# Patient Record
Sex: FEMALE | Race: BLACK OR AFRICAN AMERICAN | NOT HISPANIC OR LATINO | URBAN - METROPOLITAN AREA
[De-identification: names, ages, dates, MRNs, and addresses within clinical notes are randomized per-mention and may not be internally consistent; named-entity substitution may affect disease eponyms.]

---

## 2020-02-27 ENCOUNTER — EMERGENCY (EMERGENCY)
Facility: HOSPITAL | Age: 30
LOS: 1 days | Discharge: HOME | End: 2020-02-27
Attending: EMERGENCY MEDICINE | Admitting: EMERGENCY MEDICINE
Payer: COMMERCIAL

## 2020-02-27 VITALS
SYSTOLIC BLOOD PRESSURE: 119 MMHG | TEMPERATURE: 99 F | HEART RATE: 90 BPM | RESPIRATION RATE: 16 BRPM | DIASTOLIC BLOOD PRESSURE: 78 MMHG | OXYGEN SATURATION: 100 %

## 2020-02-27 VITALS
SYSTOLIC BLOOD PRESSURE: 123 MMHG | HEART RATE: 90 BPM | DIASTOLIC BLOOD PRESSURE: 82 MMHG | TEMPERATURE: 98 F | RESPIRATION RATE: 18 BRPM

## 2020-02-27 PROCEDURE — 99284 EMERGENCY DEPT VISIT MOD MDM: CPT

## 2020-02-27 RX ORDER — AZITHROMYCIN 500 MG/1
1 TABLET, FILM COATED ORAL
Qty: 6 | Refills: 0
Start: 2020-02-27 | End: 2020-03-02

## 2020-02-27 RX ORDER — FAMOTIDINE 10 MG/ML
20 INJECTION INTRAVENOUS ONCE
Refills: 0 | Status: COMPLETED | OUTPATIENT
Start: 2020-02-27 | End: 2020-02-27

## 2020-02-27 RX ADMIN — Medication 50 MILLIGRAM(S): at 05:39

## 2020-02-27 RX ADMIN — FAMOTIDINE 20 MILLIGRAM(S): 10 INJECTION INTRAVENOUS at 05:07

## 2020-02-27 NOTE — ED PROVIDER NOTE - PATIENT PORTAL LINK FT
You can access the FollowMyHealth Patient Portal offered by [facility] by registering at the following website: [email or web address]. By joining [facility]’s FollowMyHealth portal, you will also be able to view your health information using other applications (apps) compatible with our system.

## 2020-02-27 NOTE — ED PROVIDER NOTE - OBJECTIVE STATEMENT
30 yo F, w/ no known PMH and on no daily medications, BIBEMS from home w/ chief complaint of allergic reaction. Patient states that she has been having sore throat for a few days, and went to urgent care yesterday, where diagnosed w/ strep throat, and prescribed Amoxicillin. Patient took first dose of amoxicillin at 19:00pm on 02/26/2020, and woke up overnight with hives and pruritus. She called EMS who gave her 50mg IV Benadryl, with resolution of hives. Patient denies throat closure, facial or oral swelling, n/v/d, abdominal pain, lightheadedness, or any complaints other than hives and pruritus. Patient currently states that hives have resolved, but still endorses pruritus.     PMD: Does not have one  Pharm: [facility] [place], [place]

## 2020-02-27 NOTE — ED PROVIDER NOTE - PROGRESS NOTE DETAILS
Chintan, PGY3: Patient reports resolution of pruritus, and denies hives at this time. Patient states she currently feels asymptomatic other than her strep throat pain. On re-examination, patient continues to have lungs CTAB and no angioedema. Will discharge patient home at this time w/ Azithromycin prescription for treatment of strep throat as well as short steroid course.

## 2020-02-27 NOTE — ED PROVIDER NOTE - CLINICAL SUMMARY MEDICAL DECISION MAKING FREE TEXT BOX
30 yo F, w/ no known PMH and on no daily medications, presenting to ED d/t allergic reaction described as hives upon waking up after taking amoxicillin last night, for Strep throat diagnosed at urgent care yesterday. No n/v/d, angioedema, wheezing, sob, lightheadedness, difficulty breathing, or other symptoms suggestive of anaphylaxis, with resolution of hives prior to arrival after IV 50mg Benadryl given by EMS. Patient still pruritic in ED. Will prescribe Pepcid and Prednisone in ED and reassess. Likely d/c home w/ allergy/immunology followup.

## 2020-02-27 NOTE — ED PROVIDER NOTE - ENMT, MLM
Airway patent, Nasal mucosa clear. Mouth with normal mucosa. Throat has no vesicles, uvula is midline. No lip, tongue, or uvual swelling. +BL tonsillar exudates.

## 2020-02-27 NOTE — ED PROVIDER NOTE - ATTENDING CONTRIBUTION TO CARE
Fitzpatrick: 29 yof with sudden onset of hives, "wheels" on entire body after taking Amoxicillin for strep throat. Pt noted slight increase in throat swelling but no sob or cp. no nausea, no vomiting, no abd pain, no diarrhea. Pt has hx of allergy to shellfish. Pt given benadryl 50mg IV by EMS with some improvement. On exam, bilateral tonsilar edema with exudate. no lad, no uvular edema, clear lungs, skin some areas of redness but no visible urticaria, pt is still very itchy. Will give steroids and h2 blocker. Reassess. No concern for anaphylaxis, only 1 organ system appears involved on exam.

## 2020-02-27 NOTE — ED PROVIDER NOTE - NSFOLLOWUPINSTRUCTIONS_ED_ALL_ED_FT
Please take Benadryl (Diphenhydramine), one tablet, every 4-8 hours, as needed for symptom control. Please be aware that Benadryl may cause CNS depression, which may impair physical or mental abilities; please avoid performing tasks which require mental alertness (eg, operating machinery or driving). Please take Prednisone (50mg tablet), one tablet, once per day, for 4 days. Please take Azithromycin (250mg tablet), two tablets on day one, and then one tablet on days 2 through 5.     Please follow up with an allergist as soon as possible. You can make an appointment at the following location:  [facility] Division of Allergy/Immunology  [address] [place] [address] [address], [place] [address]  [phone]     Please return to the emergency department if you experience worsening symptoms, or if you develop headache, neck stiffness, fever/chills, changes in vision, chest pain, shortness of breath, difficulty breathing, palpitations, lightheadedness, weakness, dizziness, numbness, tingling, abdominal pain, nausea, vomiting, diarrhea, changes in your urine, blood in the urine, painful urination, syncope (passing out), or for any other concerns.

## 2020-02-27 NOTE — ED ADULT TRIAGE NOTE - CHIEF COMPLAINT QUOTE
pt presents to the ed for hives and possible allergic reaction to amoxicillin. pt took amoxicillin at 7pm for strep woke up and noticed whole body hives and increased swelling in her throat. EMS states her airway was patient but presenting with full whole body hives. EMS gave 50mg benadryl via 20G iv placed in right hand. patient speaking in clear and full sentences, no drooling. Will continue to monitor closely

## 2020-02-27 NOTE — ED ADULT NURSE NOTE - OBJECTIVE STATEMENT
received pt to room 3 aox4 in no apparent distress VSS c/o hives, possible allergic reaction. Pt states started amoxicillin yesterday for strep took two doses thus far, woke up middle of night covered in hives and itching. Pt denies difficulty swallowing or breathing but states she feels like her tongue was swollen earlier. Pt given benadryl IV by EMS, 20 G L hand flushes well positive blood return. Pt denies chest pain, weakness, n&v. Pt noted with diffuse hives on entire body with mild swelling under her eye. Breaths equal and unlabored medicated as per order will continue to monitor

## 2020-02-27 NOTE — ED PROVIDER NOTE - CONSTITUTIONAL, MLM
normal... Well appearing, awake, alert, oriented to person, place, time/situation and in no apparent distress. Patient scratches her arms and chest frequently during exam.

## 2023-07-06 ENCOUNTER — EMERGENCY (EMERGENCY)
Facility: HOSPITAL | Age: 33
LOS: 1 days | Discharge: HOME | End: 2023-07-06
Attending: EMERGENCY MEDICINE | Admitting: EMERGENCY MEDICINE
Payer: COMMERCIAL

## 2023-07-06 VITALS
RESPIRATION RATE: 18 BRPM | HEART RATE: 72 BPM | DIASTOLIC BLOOD PRESSURE: 84 MMHG | SYSTOLIC BLOOD PRESSURE: 120 MMHG | TEMPERATURE: 99 F | OXYGEN SATURATION: 100 %

## 2023-07-06 VITALS
HEART RATE: 72 BPM | DIASTOLIC BLOOD PRESSURE: 63 MMHG | OXYGEN SATURATION: 100 % | SYSTOLIC BLOOD PRESSURE: 105 MMHG | RESPIRATION RATE: 15 BRPM | TEMPERATURE: 98 F

## 2023-07-06 DIAGNOSIS — R60.0 LOCALIZED EDEMA: ICD-10-CM

## 2023-07-06 PROBLEM — Z78.9 OTHER SPECIFIED HEALTH STATUS: Chronic | Status: ACTIVE | Noted: 2020-02-27

## 2023-07-06 LAB
ALBUMIN SERPL ELPH-MCNC: 3.9 G/DL — SIGNIFICANT CHANGE UP (ref 3.3–5)
ALP SERPL-CCNC: 68 U/L — SIGNIFICANT CHANGE UP (ref 40–120)
ALT FLD-CCNC: 10 U/L — SIGNIFICANT CHANGE UP (ref 4–33)
ANION GAP SERPL CALC-SCNC: 10 MMOL/L — SIGNIFICANT CHANGE UP (ref 7–14)
AST SERPL-CCNC: 15 U/L — SIGNIFICANT CHANGE UP (ref 4–32)
BASOPHILS # BLD AUTO: 0.02 K/UL — SIGNIFICANT CHANGE UP (ref 0–0.2)
BASOPHILS NFR BLD AUTO: 0.4 % — SIGNIFICANT CHANGE UP (ref 0–2)
BILIRUB SERPL-MCNC: 0.4 MG/DL — SIGNIFICANT CHANGE UP (ref 0.2–1.2)
BUN SERPL-MCNC: 14 MG/DL — SIGNIFICANT CHANGE UP (ref 7–23)
CALCIUM SERPL-MCNC: 9.3 MG/DL — SIGNIFICANT CHANGE UP (ref 8.4–10.5)
CHLORIDE SERPL-SCNC: 105 MMOL/L — SIGNIFICANT CHANGE UP (ref 98–107)
CO2 SERPL-SCNC: 22 MMOL/L — SIGNIFICANT CHANGE UP (ref 22–31)
CREAT SERPL-MCNC: 0.7 MG/DL — SIGNIFICANT CHANGE UP (ref 0.5–1.3)
EGFR: 119 ML/MIN/1.73M2 — SIGNIFICANT CHANGE UP
EOSINOPHIL # BLD AUTO: 0.06 K/UL — SIGNIFICANT CHANGE UP (ref 0–0.5)
EOSINOPHIL NFR BLD AUTO: 1.1 % — SIGNIFICANT CHANGE UP (ref 0–6)
GLUCOSE SERPL-MCNC: 116 MG/DL — HIGH (ref 70–99)
HCG SERPL-ACNC: <1 MIU/ML — SIGNIFICANT CHANGE UP
HCT VFR BLD CALC: 37 % — SIGNIFICANT CHANGE UP (ref 34.5–45)
HGB BLD-MCNC: 11.3 G/DL — LOW (ref 11.5–15.5)
HIV 1+2 AB+HIV1 P24 AG SERPL QL IA: SIGNIFICANT CHANGE UP
IANC: 1.84 K/UL — SIGNIFICANT CHANGE UP (ref 1.8–7.4)
IMM GRANULOCYTES NFR BLD AUTO: 0.2 % — SIGNIFICANT CHANGE UP (ref 0–0.9)
LYMPHOCYTES # BLD AUTO: 3.04 K/UL — SIGNIFICANT CHANGE UP (ref 1–3.3)
LYMPHOCYTES # BLD AUTO: 56.2 % — HIGH (ref 13–44)
MCHC RBC-ENTMCNC: 27.3 PG — SIGNIFICANT CHANGE UP (ref 27–34)
MCHC RBC-ENTMCNC: 30.5 GM/DL — LOW (ref 32–36)
MCV RBC AUTO: 89.4 FL — SIGNIFICANT CHANGE UP (ref 80–100)
MONOCYTES # BLD AUTO: 0.44 K/UL — SIGNIFICANT CHANGE UP (ref 0–0.9)
MONOCYTES NFR BLD AUTO: 8.1 % — SIGNIFICANT CHANGE UP (ref 2–14)
NEUTROPHILS # BLD AUTO: 1.84 K/UL — SIGNIFICANT CHANGE UP (ref 1.8–7.4)
NEUTROPHILS NFR BLD AUTO: 34 % — LOW (ref 43–77)
NRBC # BLD: 0 /100 WBCS — SIGNIFICANT CHANGE UP (ref 0–0)
NRBC # FLD: 0 K/UL — SIGNIFICANT CHANGE UP (ref 0–0)
PLATELET # BLD AUTO: 243 K/UL — SIGNIFICANT CHANGE UP (ref 150–400)
POTASSIUM SERPL-MCNC: 3.8 MMOL/L — SIGNIFICANT CHANGE UP (ref 3.5–5.3)
POTASSIUM SERPL-SCNC: 3.8 MMOL/L — SIGNIFICANT CHANGE UP (ref 3.5–5.3)
PROT SERPL-MCNC: 6.7 G/DL — SIGNIFICANT CHANGE UP (ref 6–8.3)
RBC # BLD: 4.14 M/UL — SIGNIFICANT CHANGE UP (ref 3.8–5.2)
RBC # FLD: 12.2 % — SIGNIFICANT CHANGE UP (ref 10.3–14.5)
SODIUM SERPL-SCNC: 137 MMOL/L — SIGNIFICANT CHANGE UP (ref 135–145)
TSH SERPL-MCNC: 2.81 UIU/ML — SIGNIFICANT CHANGE UP (ref 0.27–4.2)
WBC # BLD: 5.41 K/UL — SIGNIFICANT CHANGE UP (ref 3.8–10.5)
WBC # FLD AUTO: 5.41 K/UL — SIGNIFICANT CHANGE UP (ref 3.8–10.5)

## 2023-07-06 PROCEDURE — 99285 EMERGENCY DEPT VISIT HI MDM: CPT

## 2023-07-06 PROCEDURE — 31575 DIAGNOSTIC LARYNGOSCOPY: CPT

## 2023-07-06 RX ORDER — DEXAMETHASONE 0.5 MG/5ML
10 ELIXIR ORAL ONCE
Refills: 0 | Status: COMPLETED | OUTPATIENT
Start: 2023-07-06 | End: 2023-07-06

## 2023-07-06 RX ORDER — FAMOTIDINE 10 MG/ML
20 INJECTION INTRAVENOUS ONCE
Refills: 0 | Status: COMPLETED | OUTPATIENT
Start: 2023-07-06 | End: 2023-07-06

## 2023-07-06 RX ORDER — DIPHENHYDRAMINE HCL 50 MG
25 CAPSULE ORAL ONCE
Refills: 0 | Status: COMPLETED | OUTPATIENT
Start: 2023-07-06 | End: 2023-07-06

## 2023-07-06 RX ORDER — EPINEPHRINE 0.3 MG/.3ML
0.3 INJECTION INTRAMUSCULAR; SUBCUTANEOUS ONCE
Refills: 0 | Status: COMPLETED | OUTPATIENT
Start: 2023-07-06 | End: 2023-07-06

## 2023-07-06 RX ORDER — EPINEPHRINE 0.3 MG/.3ML
0.3 INJECTION INTRAMUSCULAR; SUBCUTANEOUS
Qty: 1 | Refills: 0
Start: 2023-07-06 | End: 2023-07-17

## 2023-07-06 RX ADMIN — EPINEPHRINE 0.3 MILLIGRAM(S): 0.3 INJECTION INTRAMUSCULAR; SUBCUTANEOUS at 02:15

## 2023-07-06 RX ADMIN — FAMOTIDINE 20 MILLIGRAM(S): 10 INJECTION INTRAVENOUS at 01:58

## 2023-07-06 RX ADMIN — Medication 102 MILLIGRAM(S): at 02:06

## 2023-07-06 RX ADMIN — Medication 25 MILLIGRAM(S): at 01:58

## 2023-07-06 NOTE — ED ADULT NURSE REASSESSMENT NOTE - NS ED NURSE REASSESS COMMENT FT1
Pt sleeping in bed comfortably, NAD, NSR on the monitor. Respirations even and unlabored. VSS in flowsheet. Pt denies throat closing, difficulty breathing, shortness of breath, N/V/D, pruritis.

## 2023-07-06 NOTE — ED PROVIDER NOTE - CLINICAL SUMMARY MEDICAL DECISION MAKING FREE TEXT BOX
33 yo F hx of lupus not on meds presents with lip swelling, sore throat since this evening - vss. No wheezing on exam. Will give pepcid, benadryl. Will monitor for worsening symptoms - will give Epi for any evidence of progression of symptoms.

## 2023-07-06 NOTE — ED PROVIDER NOTE - ATTENDING CONTRIBUTION TO CARE
HPI: 32-year-old female with recently diagnosed lupus not on medications that presents with lip swelling, sore throat, nausea concern for allergic reaction.  Patient reports that she woke up with symptoms as above and took 1 p.o. Benadryl 25 mg prior to arrival.  Patient states she has had similar symptoms twice in the past and recently saw a dermatologist who diagnosed with lupus with a biopsy but also saw an allergist that did not find any allergens that can cause her condition.  Patient states that this is happened in the past and has been seen at emergency room's but no diagnosis has been given.  Patient denies any new allergens, ingestions, exposures.  Unknown cause of her condition but no drooling no stridor no wheezing no vomiting no fevers no chills no urinary symptoms no URI symptoms.  Non-smoker no drinking no drugs.  LMP 2 weeks ago.  Does not think she is pregnant.    EXAM: Speaking full sentences alert and oriented x3, upper lip with swelling compared to lower lip but oropharynx is otherwise normal uvula is midline tongue is flat.  No brawny induration in the anterior neck.  No stridor no wheezing on lungs abdomen soft nontender skin with hives.  No excoriations.    MDM: 32-year-old female recently diagnosed with lupus not on medications that presents with what appears to be allergic reaction to unknown cause/allergen.  Has had this prior but no allergen found.  Saw allergy doctor and dermatologist who recently diagnosed with lupus.  Denies any medications or ingestions or exposures.  At this time will treat with presumptive allergic reaction but also check labs and thyroid and HIV and reassess.  Will provide Benadryl, steroids, Pepcid and closely monitor.

## 2023-07-06 NOTE — ED PROVIDER NOTE - OBJECTIVE STATEMENT
33 yo F pmh of recently diagnosed lupus presents with lip swelling, itchy throat, nausea that started tonight. Patient was woken up from sleep with her symptoms. Mild SOB. Patient has hives all over her body. Recently had a similar episode on Wednesday, was given prednisone. Has had an allergist and dermatologist eval but no allergen found.

## 2023-07-06 NOTE — CONSULT NOTE ADULT - SUBJECTIVE AND OBJECTIVE BOX
CC: lip swelling     HPI: patient recently diagnosed with lupus. Presents to the ED c/o lip swelling. Denies any SOB, Hoarseness or other complaints.        PAST MEDICAL & SURGICAL HISTORY:  No pertinent past medical history      No significant past surgical history        Allergies    No Known Allergies    Intolerances      MEDICATIONS  (STANDING):    MEDICATIONS  (PRN):      ROS:   ENT: all negative except as noted in HPI   CV: denies palpitations  Pulm: denies SOB, cough, hemoptysis  GI: denies change in apetite, indigestion, n/v  : denies pertinent urinary symptoms, urgency  Neuro: denies numbness/tingling, loss of sensation  Psych: denies anxiety  MS: denies muscle weakness, instability  Heme: denies easy bruising or bleeding  Endo: denies heat/cold intolerance, excessive sweating  Vascular: denies LE edema    Vital Signs Last 24 Hrs  T(C): 36.9 (06 Jul 2023 05:31), Max: 37 (06 Jul 2023 01:33)  T(F): 98.5 (06 Jul 2023 05:31), Max: 98.6 (06 Jul 2023 01:33)  HR: 83 (06 Jul 2023 05:31) (72 - 83)  BP: 103/68 (06 Jul 2023 05:31) (103/68 - 120/84)  BP(mean): --  RR: 18 (06 Jul 2023 05:31) (18 - 18)  SpO2: 100% (06 Jul 2023 05:31) (100% - 100%)    Parameters below as of 06 Jul 2023 05:31  Patient On (Oxygen Delivery Method): room air                              11.3   5.41  )-----------( 243      ( 06 Jul 2023 02:09 )             37.0    07-06    137  |  105  |  14  ----------------------------<  116<H>  3.8   |  22  |  0.70    Ca    9.3      06 Jul 2023 02:09    TPro  6.7  /  Alb  3.9  /  TBili  0.4  /  DBili  x   /  AST  15  /  ALT  10  /  AlkPhos  68  07-06       PHYSICAL EXAM:  Gen: NAD  Skin: No rashes, bruises, or lesions  Head: Normocephalic, Atraumatic  Face: no edema, erythema, or fluctuance. Parotid glands soft without mass  Eyes: no scleral injection  Ears: Right - ear canal clear, TM intact without effusion or erythema. No evidence of any fluid drainage. No mastoid tenderness, erythema, or ear bulging            Left - ear canal clear, TM intact without effusion or erythema. No evidence of any fluid drainage. No mastoid tenderness, erythema, or ear bulging  Nose: Nares bilaterally patent, no discharge  Mouth: Upper lip edema. No Stridor / Drooling / Trismus.  Mucosa moist, tongue/uvula midline, oropharynx clear  Neck: Flat, supple, no lymphadenopathy, trachea midline, no masses  Lymphatic: No lymphadenopathy  Resp: breathing easily, no stridor  CV: no peripheral edema/cyanosis  GI: nondistended   Peripheral vascular: no JVD or edema  Neuro: facial nerve intact, no facial droop      Diagnostic Nasal Endoscopy: pink, moist and sensate    Fiberoptic Indirect laryngoscopy: No base of tongue swelling. epiglottis sharp. No base of tongue edema, No vocal cord edema.

## 2023-07-06 NOTE — ED ADULT NURSE NOTE - OBJECTIVE STATEMENT
Pb RN: Patient received to [facility], ambulatory, c/o lip swelling. Hx lupus, not on medication. Patient states she woke up at 12AM with lip swelling, throat discomfort and nausea. Endorsing hives to upper extremities. Took benadryl without relief. Denies chest pain, vomiting, fever and chills. Lips appear swollen, airway patent. Placed on cardiac monitor, NSR. Safety maintained.

## 2023-07-06 NOTE — ED ADULT NURSE REASSESSMENT NOTE - NS ED NURSE REASSESS COMMENT FT1
Received rpt from SENT[facility] Moeller. Pt. A&OX4, c/o lip swelling that started this AM. States she didn't use or eat anything new. Allergic to shellfish but made her own food. Pt. felling better but upper lip still slightly swollen. Awaiting discharge.

## 2023-07-06 NOTE — ED PROVIDER NOTE - NSFOLLOWUPINSTRUCTIONS_ED_ALL_ED_FT
You are prescribed an EpiPen to the [facility] pharmacy/vehicle pharmacy please take this medication if you develop severe shortness of breath    Please follow-up with your primary care provider and re-  touch base with your allergist/dermatologist in regards to today's visit    Angioedema is the sudden swelling of tissue in the body. Angioedema can affect any part of the body, including the legs, hands, genitals, face, mouth, lips, and internal organs, like your intestines.    Depending on the cause, angioedema may happen just once. However, some people may have repeated bouts of angioedema during their lives.    Symptoms may be mild and may occur along with other allergic symptoms such as itchy, red, swollen areas of skin (hives). Severe angioedema can be life-threatening if it affects the air passages and blocks breathing.    What are the causes?  This condition may be caused by:  Foods, such as milk, eggs, shellfish, wheat, or nuts.  Certain medicines, such as ACE inhibitors, birth control pills, dyes used in X-rays, or NSAIDs, such as ibuprofen.  Hereditary angioedema (HAE) is genetic. Episodes can be triggered by:  Illness, infection, or emotional or physical stress.  Changes in hormone levels.  Exercise.  Minor surgical or dental procedures.  In some cases, the cause of this condition is not known.    What increases the risk?  You are more likely to develop HAE if you have family members with this condition.    What are the signs or symptoms?  A hand with angioedema compared to a normal hand.   Symptoms of this condition depend on where the swelling happens.    Symptoms of this condition include:  Swollen skin.  Hives.  Pain, pressure, or tenderness in the affected area.  Swollen eyelids, face, lips, or tongue.  Trouble drinking, swallowing, or closing the mouth completely.  Hoarseness or sore throat.  Wheezing or trouble breathing.  If your internal organs are affected, symptoms may also include:  Nausea.  Pain in the abdomen.  Vomiting or diarrhea.  Trouble swallowing.  Trouble passing urine.  How is this diagnosed?  This condition may be diagnosed based on:  An exam of the affected area.  Your medical history.  Whether anyone in your family has had this condition before.  A review of any medicines you have been taking.  Tests, including:  Allergy skin tests to see if the condition was caused by an allergic reaction.  Blood tests to see if the condition was caused by certain inherited or genetic diseases.  How is this treated?  Treatment for this condition depends on the cause and severity of your symptoms. It may involve any of the following:  Avoiding triggers, if they are known. Triggers may include foods or environmental allergens.  Stopping medicines permanently if they cause the condition. These include ACE inhibitors.  Taking medicines to treat symptoms or prevent future episodes. These may include:  Antihistamines.  Epinephrine injections.  Steroids.  Blood products to treat specific types of non-allergic angioedema.  Breathing tubes or ventilators in severe cases in which breathing is affected.  Severe cases of angioedema are treated at the hospital. Mild to moderate angioedema usually gets better in 24–48 hours.    Follow these instructions at home:  Medical alert bracelet.  Take over-the-counter and prescription medicines only as told by your health care provider.  If you were given medicines for emergency allergy treatment, always carry them with you. This includes epinephrine injector kits.  Wear a medical bracelet as told by your health care provider.  If something triggers your condition, avoid the trigger. Triggers can be foods, environmental allergens, stress, or exercise.  Avoid all medicines that caused your angioedema. This is for your entire life.  If your condition is inherited and you are thinking about having children, talk to your health care provider. It is important to discuss the risks of passing on the condition to your children.  Where to find more information  American Academy of Allergy Asthma & Immunology: www.aaaai.org  Contact a health care provider if:  You continue to have repeated episodes of angioedema.  Episodes of angioedema start to happen more often than they used to, even after you take steps to prevent them.  You have episodes of angioedema that are more severe than they have been before, even after you take steps to prevent them.  You are thinking about having children.  Get help right away if:  You have severe swelling of your mouth, tongue, or lips.  Your swelling gets worse.  You have trouble breathing, swallowing, or talking.  You have chest pain, dizziness or light-headedness, or you pass out.  These symptoms may represent a serious problem that is an emergency. Do not wait to see if the symptoms will go away. Get medical help right away. Call your local emergency services (911 in the [place] Do not drive yourself to the hospital.    Summary  Angioedema is the sudden swelling of tissues.  It is important to be aware of all triggers or causes for your angioedema and to avoid them.  Treatment for this condition depends on the cause and severity of your symptoms.  Severe angioedema can be life-threatening if it blocks the air passages.  This information is not intended to replace advice given to you by your health care provider. Make sure you discuss any questions you have with your health care provider.

## 2023-07-06 NOTE — ED ADULT NURSE NOTE - CHIEF COMPLAINT QUOTE
Pt c/o lip swelling, b/l eye swelling and throat tightness waking from sleep. Reports 2nd time this week occurred. reports only known allergy shell fish. Reports recent diagnosis lupus.

## 2023-07-06 NOTE — ED PROVIDER NOTE - PATIENT PORTAL LINK FT
You can access the FollowMyHealth Patient Portal offered by [facility] by registering at the following website: [email or web address]. By joining [employer]’s FollowMyHealth portal, you will also be able to view your health information using other applications (apps) compatible with our system.

## 2023-07-06 NOTE — ED PROVIDER NOTE - PROGRESS NOTE DETAILS
Patient had increasing itchiness of the throat on my reeval - Epinephrine given.    Fabienne Mcelroy MD, PGY3 ANDREZ: Pt pending ENT consult and recs but lip still swollen, no longer having any resp symptoms but feels like nothing is improving. Will monitor pending ENT recs to r/o airway compromise. Claudia PGY 2 ent cdu 2 see [place] watch Claudia PGY 2 ent aware Claudia PGY  3 ENT scope no concern from the their end Claudia PGY 2 Patient wishes to go home and to follow-up with primary care provider

## 2025-07-13 ENCOUNTER — EMERGENCY (EMERGENCY)
Facility: HOSPITAL | Age: 35
LOS: 1 days | End: 2025-07-13
Attending: EMERGENCY MEDICINE | Admitting: EMERGENCY MEDICINE
Payer: MEDICAID

## 2025-07-13 VITALS
OXYGEN SATURATION: 98 % | HEART RATE: 70 BPM | DIASTOLIC BLOOD PRESSURE: 86 MMHG | HEIGHT: 63 IN | TEMPERATURE: 98 F | WEIGHT: 136.91 LBS | SYSTOLIC BLOOD PRESSURE: 127 MMHG | RESPIRATION RATE: 16 BRPM

## 2025-07-13 VITALS
RESPIRATION RATE: 18 BRPM | SYSTOLIC BLOOD PRESSURE: 123 MMHG | TEMPERATURE: 98 F | HEART RATE: 65 BPM | OXYGEN SATURATION: 100 % | DIASTOLIC BLOOD PRESSURE: 80 MMHG

## 2025-07-13 LAB
ALBUMIN SERPL ELPH-MCNC: 5 G/DL — SIGNIFICANT CHANGE UP (ref 3.3–5)
ALP SERPL-CCNC: 80 U/L — SIGNIFICANT CHANGE UP (ref 40–120)
ALT FLD-CCNC: 15 U/L — SIGNIFICANT CHANGE UP (ref 4–33)
ANION GAP SERPL CALC-SCNC: 13 MMOL/L — SIGNIFICANT CHANGE UP (ref 7–14)
AST SERPL-CCNC: 21 U/L — SIGNIFICANT CHANGE UP (ref 4–32)
BASOPHILS # BLD AUTO: 0.03 K/UL — SIGNIFICANT CHANGE UP (ref 0–0.2)
BASOPHILS NFR BLD AUTO: 0.6 % — SIGNIFICANT CHANGE UP (ref 0–2)
BILIRUB SERPL-MCNC: 0.9 MG/DL — SIGNIFICANT CHANGE UP (ref 0.2–1.2)
BUN SERPL-MCNC: 13 MG/DL — SIGNIFICANT CHANGE UP (ref 7–23)
CALCIUM SERPL-MCNC: 10.2 MG/DL — SIGNIFICANT CHANGE UP (ref 8.4–10.5)
CHLORIDE SERPL-SCNC: 100 MMOL/L — SIGNIFICANT CHANGE UP (ref 98–107)
CO2 SERPL-SCNC: 25 MMOL/L — SIGNIFICANT CHANGE UP (ref 22–31)
CREAT SERPL-MCNC: 0.64 MG/DL — SIGNIFICANT CHANGE UP (ref 0.5–1.3)
EGFR: 120 ML/MIN/1.73M2 — SIGNIFICANT CHANGE UP
EGFR: 120 ML/MIN/1.73M2 — SIGNIFICANT CHANGE UP
EOSINOPHIL # BLD AUTO: 0.04 K/UL — SIGNIFICANT CHANGE UP (ref 0–0.5)
EOSINOPHIL NFR BLD AUTO: 0.9 % — SIGNIFICANT CHANGE UP (ref 0–6)
GLUCOSE SERPL-MCNC: 77 MG/DL — SIGNIFICANT CHANGE UP (ref 70–99)
HCT VFR BLD CALC: 41.2 % — SIGNIFICANT CHANGE UP (ref 34.5–45)
HGB BLD-MCNC: 12.7 G/DL — SIGNIFICANT CHANGE UP (ref 11.5–15.5)
IMM GRANULOCYTES # BLD AUTO: 0.01 K/UL — SIGNIFICANT CHANGE UP (ref 0–0.07)
IMM GRANULOCYTES NFR BLD AUTO: 0.2 % — SIGNIFICANT CHANGE UP (ref 0–0.9)
LIDOCAIN IGE QN: 30 U/L — SIGNIFICANT CHANGE UP (ref 7–60)
LYMPHOCYTES # BLD AUTO: 2.07 K/UL — SIGNIFICANT CHANGE UP (ref 1–3.3)
LYMPHOCYTES NFR BLD AUTO: 44.6 % — HIGH (ref 13–44)
MCHC RBC-ENTMCNC: 27.3 PG — SIGNIFICANT CHANGE UP (ref 27–34)
MCHC RBC-ENTMCNC: 30.8 G/DL — LOW (ref 32–36)
MCV RBC AUTO: 88.4 FL — SIGNIFICANT CHANGE UP (ref 80–100)
MONOCYTES # BLD AUTO: 0.51 K/UL — SIGNIFICANT CHANGE UP (ref 0–0.9)
MONOCYTES NFR BLD AUTO: 11 % — SIGNIFICANT CHANGE UP (ref 2–14)
NEUTROPHILS # BLD AUTO: 1.98 K/UL — SIGNIFICANT CHANGE UP (ref 1.8–7.4)
NEUTROPHILS NFR BLD AUTO: 42.7 % — LOW (ref 43–77)
NRBC # BLD AUTO: 0 K/UL — SIGNIFICANT CHANGE UP (ref 0–0)
NRBC # FLD: 0 K/UL — SIGNIFICANT CHANGE UP (ref 0–0)
NRBC BLD AUTO-RTO: 0 /100 WBCS — SIGNIFICANT CHANGE UP (ref 0–0)
PLATELET # BLD AUTO: 244 K/UL — SIGNIFICANT CHANGE UP (ref 150–400)
PMV BLD: 11.1 FL — SIGNIFICANT CHANGE UP (ref 7–13)
POTASSIUM SERPL-MCNC: 4.1 MMOL/L — SIGNIFICANT CHANGE UP (ref 3.5–5.3)
POTASSIUM SERPL-SCNC: 4.1 MMOL/L — SIGNIFICANT CHANGE UP (ref 3.5–5.3)
PROT SERPL-MCNC: 8.4 G/DL — HIGH (ref 6–8.3)
RBC # BLD: 4.66 M/UL — SIGNIFICANT CHANGE UP (ref 3.8–5.2)
RBC # FLD: 11.9 % — SIGNIFICANT CHANGE UP (ref 10.3–14.5)
SODIUM SERPL-SCNC: 138 MMOL/L — SIGNIFICANT CHANGE UP (ref 135–145)
WBC # BLD: 4.64 K/UL — SIGNIFICANT CHANGE UP (ref 3.8–10.5)
WBC # FLD AUTO: 4.64 K/UL — SIGNIFICANT CHANGE UP (ref 3.8–10.5)

## 2025-07-13 PROCEDURE — 99285 EMERGENCY DEPT VISIT HI MDM: CPT

## 2025-07-13 PROCEDURE — 73030 X-RAY EXAM OF SHOULDER: CPT | Mod: 26,RT

## 2025-07-13 PROCEDURE — 93010 ELECTROCARDIOGRAM REPORT: CPT

## 2025-07-13 RX ORDER — FOLIC ACID 1 MG/1
1 TABLET ORAL
Qty: 30 | Refills: 0
Start: 2025-07-13 | End: 2025-08-11

## 2025-07-13 RX ORDER — METHOTREXATE 25 MG/ML
1 INJECTION, SOLUTION INTRA-ARTERIAL; INTRAMUSCULAR; INTRATHECAL; INTRAVENOUS
Qty: 4 | Refills: 0
Start: 2025-07-13 | End: 2025-08-11

## 2025-07-13 RX ORDER — HYDROXYCHLOROQUINE SULFATE 200 MG/1
1 TABLET, FILM COATED ORAL
Qty: 30 | Refills: 0
Start: 2025-07-13 | End: 2025-08-11

## 2025-07-13 RX ORDER — ONDANSETRON HCL/PF 4 MG/2 ML
4 VIAL (ML) INJECTION ONCE
Refills: 0 | Status: COMPLETED | OUTPATIENT
Start: 2025-07-13 | End: 2025-07-13

## 2025-07-13 RX ORDER — ACETAMINOPHEN 500 MG/5ML
1000 LIQUID (ML) ORAL ONCE
Refills: 0 | Status: COMPLETED | OUTPATIENT
Start: 2025-07-13 | End: 2025-07-13

## 2025-07-13 RX ADMIN — Medication 1000 MILLILITER(S): at 19:11

## 2025-07-13 RX ADMIN — Medication 20 MILLIGRAM(S): at 19:12

## 2025-07-13 RX ADMIN — Medication 400 MILLIGRAM(S): at 19:12

## 2025-07-13 RX ADMIN — Medication 4 MILLIGRAM(S): at 19:11

## 2025-07-13 NOTE — ED ADULT NURSE NOTE - OBJECTIVE STATEMENT
pt alert, oriented x3. reports headache ,nausea starting on friday. states numbness ,tingling r side of body since friday. awaits md evaluation.  states hx of lupus. takes meds as ordered by md. await md orders. will continue to monitor

## 2025-07-13 NOTE — ED PROVIDER NOTE - NSFOLLOWUPINSTRUCTIONS_ED_ALL_ED_FT
Rest, drink plenty of fluids.  Advance activity as tolerated.  Continue all previously prescribed medications as directed.  Follow up with your primary care physician and rheumatologist in 48-72 hours- bring copies of your results.  Return to the ER for worsening or persistent symptoms, and/or ANY NEW OR CONCERNING SYMPTOMS. If you have issues obtaining follow up, please call: [phone] to obtain a doctor or specialist who takes your insurance in your area.

## 2025-07-13 NOTE — ED PROVIDER NOTE - CARE PLAN
Principal Discharge DX:	Dizziness  Secondary Diagnosis:	Paresthesia  Secondary Diagnosis:	Right shoulder pain   1

## 2025-07-13 NOTE — ED PROVIDER NOTE - EXTREMITY EXAM
right shoulder: mildly limited ROM due to pain, no deformity, no tenderness, moving all other extremities w/o difficulty, strength 5/5.

## 2025-07-13 NOTE — ED PROVIDER NOTE - CLINICAL SUMMARY MEDICAL DECISION MAKING FREE TEXT BOX
33 yo F with PMH of lupus on MTX and Plaquenil, presents to ED c/o lightheadedness w/ nausea x1 week, right hand and intermittent right feet tingling/numbness x2 days, headache x2 days, well appearing female. discontinued medication due to loss of insurance. No focal neuro deficits. EKG w/ sinus rhythm with marked sinus arrythmia. Ddx: gastritis/GERD, shoulder pain/arthritis, paresthesia from lupus. Plan: labs for electrolyte abnormality, Xray R shoulder, IVF for hydration, pain control, GI cocktail, SW for medication/insurance coverage and reassessment. Ernestina att: 35 yo F with PMH of lupus on MTX and Plaquenil, presents to ED c/o lightheadedness w/ nausea x1 week, right hand and intermittent right feet tingling/numbness x2 days, headache x2 days, well appearing female. discontinued medication due to loss of insurance. No focal neuro deficits. EKG w/ sinus rhythm with marked sinus arrythmia. Ddx: gastritis/GERD, shoulder pain/arthritis, paresthesia from lupus. Plan: labs for electrolyte abnormality, Xray R shoulder, IVF for hydration, pain control, GI cocktail, SW for medication/insurance coverage and reassessment.

## 2025-07-13 NOTE — ED PROVIDER NOTE - PROGRESS NOTE DETAILS
ROSANGELA PEREZ: spoke with SW, pt has active medicaid can use for prescription. labs reviewed. Xray R shoulder no acute fx, mild arthritis. Discussed with attending, patient can be discharged home w/ prescriptions, to f/u with PCP and rheumatologist.  Patient reassessed, sitting comfortably in chair in NAD, denies any complaints. States feeling better, symptoms improved. The patient was given verbal and written discharge instructions. Specifically, instructions when to return to the ED and when to seek follow-up from their pcp was discussed. Any specialty follow-up was discussed, including how to make an appointment.  Instructions were discussed in simple, plain language and was understood by the patient. The patient understands that should their symptoms worsen or any new symptoms arise, they should return to the ED immediately for further evaluation. All pt's questions were answered. Patient verbalizes understanding.

## 2025-07-13 NOTE — ED ADULT TRIAGE NOTE - CHIEF COMPLAINT QUOTE
Patient c/o dizziness, nausea, vomiting, blurry vision and numbness to both arms x 2 days. Denies headache, chest pain, SOB, fever. Phx lupus, asthma

## 2025-07-13 NOTE — PROVIDER CONTACT NOTE (OTHER) - ASSESSMENT
Pt told provider that she has no insurance, unable to get Medications. SW Checked on [place] and pt has Active Medicaid. SW met with pt in her room. Pt said she lost her job., had applied for medicaid, did not get any response. SW provided her the Medicaid Number.

## 2025-07-13 NOTE — ED PROVIDER NOTE - OBJECTIVE STATEMENT
33 yo F with PMH of lupus on MTX and Plaquenil, presents to ED c/o lightheadedness w/ nausea x1 week, right hand and intermittent right feet tingling/numbness x2 days, headache x2 days, intermittent blurry vision x2 days. Reports she lost her job, no insurance, has not taken her medication for 2 months. Admits migraine in the past. Denies any fever, chills, vomiting, diarrhea, constipation, chest pain, sob, neck pain, or any other complaints.

## 2025-07-13 NOTE — ED PROVIDER NOTE - PATIENT PORTAL LINK FT
You can access the FollowMyHealth Patient Portal offered by [facility] by registering at the following website: [email or web address]. By joining [employer]’s FollowMyHealth portal, you will also be able to view your health information using other applications (apps) compatible with our system.

## 2025-07-17 RX ORDER — METHOTREXATE 25 MG/ML
1 INJECTION, SOLUTION INTRA-ARTERIAL; INTRAMUSCULAR; INTRATHECAL; INTRAVENOUS
Qty: 4 | Refills: 0
Start: 2025-07-17 | End: 2025-08-15

## 2025-07-17 RX ORDER — HYDROXYCHLOROQUINE SULFATE 200 MG/1
1 TABLET, FILM COATED ORAL
Qty: 30 | Refills: 0
Start: 2025-07-17 | End: 2025-08-15

## 2025-07-17 RX ORDER — FOLIC ACID 1 MG/1
1 TABLET ORAL
Qty: 30 | Refills: 0
Start: 2025-07-17 | End: 2025-08-15

## 2025-07-17 NOTE — POST DISCHARGE NOTE - NOTIFICATION:
patient called requesting her 3 prescriptions be sent to another pharmacy and the one they were originally sent to was giving her a lot of [id number] with her insurance.  pt requested I send them to [facility] which I did.

## 2025-09-14 ENCOUNTER — EMERGENCY (EMERGENCY)
Facility: HOSPITAL | Age: 35
LOS: 1 days | End: 2025-09-14
Admitting: EMERGENCY MEDICINE
Payer: MEDICAID

## 2025-09-14 VITALS
DIASTOLIC BLOOD PRESSURE: 87 MMHG | SYSTOLIC BLOOD PRESSURE: 143 MMHG | HEART RATE: 67 BPM | RESPIRATION RATE: 17 BRPM | OXYGEN SATURATION: 100 % | WEIGHT: 136.03 LBS | TEMPERATURE: 99 F

## 2025-09-14 VITALS
HEART RATE: 67 BPM | OXYGEN SATURATION: 100 % | SYSTOLIC BLOOD PRESSURE: 133 MMHG | DIASTOLIC BLOOD PRESSURE: 92 MMHG | TEMPERATURE: 98 F | RESPIRATION RATE: 16 BRPM

## 2025-09-14 LAB
ALBUMIN SERPL ELPH-MCNC: 4.3 G/DL — SIGNIFICANT CHANGE UP (ref 3.3–5)
ALP SERPL-CCNC: 63 U/L — SIGNIFICANT CHANGE UP (ref 40–120)
ALT FLD-CCNC: 6 U/L — SIGNIFICANT CHANGE UP (ref 4–33)
ANION GAP SERPL CALC-SCNC: 8 MMOL/L — SIGNIFICANT CHANGE UP (ref 7–14)
APPEARANCE UR: ABNORMAL
AST SERPL-CCNC: 13 U/L — SIGNIFICANT CHANGE UP (ref 4–32)
BACTERIA # UR AUTO: NEGATIVE /HPF — SIGNIFICANT CHANGE UP
BASOPHILS # BLD AUTO: 0.03 K/UL — SIGNIFICANT CHANGE UP (ref 0–0.2)
BASOPHILS NFR BLD AUTO: 0.7 % — SIGNIFICANT CHANGE UP (ref 0–2)
BILIRUB SERPL-MCNC: 0.6 MG/DL — SIGNIFICANT CHANGE UP (ref 0.2–1.2)
BILIRUB UR-MCNC: NEGATIVE — SIGNIFICANT CHANGE UP
BLOOD GAS VENOUS COMPREHENSIVE RESULT: SIGNIFICANT CHANGE UP
BUN SERPL-MCNC: 16 MG/DL — SIGNIFICANT CHANGE UP (ref 7–23)
CALCIUM SERPL-MCNC: 9.3 MG/DL — SIGNIFICANT CHANGE UP (ref 8.4–10.5)
CAST: 0 /LPF — SIGNIFICANT CHANGE UP (ref 0–4)
CHLORIDE SERPL-SCNC: 104 MMOL/L — SIGNIFICANT CHANGE UP (ref 98–107)
CO2 SERPL-SCNC: 27 MMOL/L — SIGNIFICANT CHANGE UP (ref 22–31)
COLOR SPEC: YELLOW — SIGNIFICANT CHANGE UP
CREAT SERPL-MCNC: 0.78 MG/DL — SIGNIFICANT CHANGE UP (ref 0.5–1.3)
DIFF PNL FLD: NEGATIVE — SIGNIFICANT CHANGE UP
EGFR: 103 ML/MIN/1.73M2 — SIGNIFICANT CHANGE UP
EGFR: 103 ML/MIN/1.73M2 — SIGNIFICANT CHANGE UP
EOSINOPHIL # BLD AUTO: 0.07 K/UL — SIGNIFICANT CHANGE UP (ref 0–0.5)
EOSINOPHIL NFR BLD AUTO: 1.6 % — SIGNIFICANT CHANGE UP (ref 0–6)
GLUCOSE SERPL-MCNC: 88 MG/DL — SIGNIFICANT CHANGE UP (ref 70–99)
GLUCOSE UR QL: NEGATIVE MG/DL — SIGNIFICANT CHANGE UP
HCT VFR BLD CALC: 38.6 % — SIGNIFICANT CHANGE UP (ref 34.5–45)
HGB BLD-MCNC: 11.9 G/DL — SIGNIFICANT CHANGE UP (ref 11.5–15.5)
IMM GRANULOCYTES # BLD AUTO: 0.01 K/UL — SIGNIFICANT CHANGE UP (ref 0–0.07)
IMM GRANULOCYTES NFR BLD AUTO: 0.2 % — SIGNIFICANT CHANGE UP (ref 0–0.9)
KETONES UR QL: NEGATIVE MG/DL — SIGNIFICANT CHANGE UP
LEUKOCYTE ESTERASE UR-ACNC: NEGATIVE — SIGNIFICANT CHANGE UP
LYMPHOCYTES # BLD AUTO: 1.71 K/UL — SIGNIFICANT CHANGE UP (ref 1–3.3)
LYMPHOCYTES NFR BLD AUTO: 39.9 % — SIGNIFICANT CHANGE UP (ref 13–44)
MCHC RBC-ENTMCNC: 27.7 PG — SIGNIFICANT CHANGE UP (ref 27–34)
MCHC RBC-ENTMCNC: 30.8 G/DL — LOW (ref 32–36)
MCV RBC AUTO: 89.8 FL — SIGNIFICANT CHANGE UP (ref 80–100)
MONOCYTES # BLD AUTO: 0.49 K/UL — SIGNIFICANT CHANGE UP (ref 0–0.9)
MONOCYTES NFR BLD AUTO: 11.4 % — SIGNIFICANT CHANGE UP (ref 2–14)
NEUTROPHILS # BLD AUTO: 1.98 K/UL — SIGNIFICANT CHANGE UP (ref 1.8–7.4)
NEUTROPHILS NFR BLD AUTO: 46.2 % — SIGNIFICANT CHANGE UP (ref 43–77)
NITRITE UR-MCNC: NEGATIVE — SIGNIFICANT CHANGE UP
NRBC # BLD AUTO: 0 K/UL — SIGNIFICANT CHANGE UP (ref 0–0)
NRBC # FLD: 0 K/UL — SIGNIFICANT CHANGE UP (ref 0–0)
NRBC BLD AUTO-RTO: 0 /100 WBCS — SIGNIFICANT CHANGE UP (ref 0–0)
PH UR: 8 — SIGNIFICANT CHANGE UP (ref 5–8)
PLATELET # BLD AUTO: 219 K/UL — SIGNIFICANT CHANGE UP (ref 150–400)
PMV BLD: 10.9 FL — SIGNIFICANT CHANGE UP (ref 7–13)
POTASSIUM SERPL-MCNC: 4.2 MMOL/L — SIGNIFICANT CHANGE UP (ref 3.5–5.3)
POTASSIUM SERPL-SCNC: 4.2 MMOL/L — SIGNIFICANT CHANGE UP (ref 3.5–5.3)
PROT SERPL-MCNC: 7 G/DL — SIGNIFICANT CHANGE UP (ref 6–8.3)
PROT UR-MCNC: NEGATIVE MG/DL — SIGNIFICANT CHANGE UP
RBC # BLD: 4.3 M/UL — SIGNIFICANT CHANGE UP (ref 3.8–5.2)
RBC # FLD: 11.9 % — SIGNIFICANT CHANGE UP (ref 10.3–14.5)
RBC CASTS # UR COMP ASSIST: 0 /HPF — SIGNIFICANT CHANGE UP (ref 0–4)
SODIUM SERPL-SCNC: 139 MMOL/L — SIGNIFICANT CHANGE UP (ref 135–145)
SP GR SPEC: 1.02 — SIGNIFICANT CHANGE UP (ref 1–1.03)
SQUAMOUS # UR AUTO: 3 /HPF — SIGNIFICANT CHANGE UP (ref 0–5)
UROBILINOGEN FLD QL: 1 MG/DL — SIGNIFICANT CHANGE UP (ref 0.2–1)
WBC # BLD: 4.29 K/UL — SIGNIFICANT CHANGE UP (ref 3.8–10.5)
WBC # FLD AUTO: 4.29 K/UL — SIGNIFICANT CHANGE UP (ref 3.8–10.5)
WBC UR QL: 1 /HPF — SIGNIFICANT CHANGE UP (ref 0–5)

## 2025-09-14 PROCEDURE — 99285 EMERGENCY DEPT VISIT HI MDM: CPT

## 2025-09-14 PROCEDURE — 73564 X-RAY EXAM KNEE 4 OR MORE: CPT | Mod: 26,RT

## 2025-09-14 PROCEDURE — 74177 CT ABD & PELVIS W/CONTRAST: CPT | Mod: 26

## 2025-09-14 PROCEDURE — 76830 TRANSVAGINAL US NON-OB: CPT | Mod: 26

## 2025-09-14 PROCEDURE — 93975 VASCULAR STUDY: CPT | Mod: 26

## 2025-09-14 RX ORDER — ACETAMINOPHEN 500 MG/5ML
1000 LIQUID (ML) ORAL ONCE
Refills: 0 | Status: COMPLETED | OUTPATIENT
Start: 2025-09-14 | End: 2025-09-14

## 2025-09-14 RX ORDER — KETOROLAC TROMETHAMINE 30 MG/ML
15 INJECTION, SOLUTION INTRAMUSCULAR; INTRAVENOUS ONCE
Refills: 0 | Status: DISCONTINUED | OUTPATIENT
Start: 2025-09-14 | End: 2025-09-14

## 2025-09-14 RX ADMIN — KETOROLAC TROMETHAMINE 15 MILLIGRAM(S): 30 INJECTION, SOLUTION INTRAMUSCULAR; INTRAVENOUS at 17:28

## 2025-09-14 RX ADMIN — Medication 4000 MILLILITER(S): at 17:28

## 2025-09-14 RX ADMIN — Medication 400 MILLIGRAM(S): at 17:28

## 2025-09-15 PROBLEM — M32.9 SYSTEMIC LUPUS ERYTHEMATOSUS, UNSPECIFIED: Chronic | Status: ACTIVE | Noted: 2025-07-13
